# Patient Record
Sex: FEMALE | Race: WHITE
[De-identification: names, ages, dates, MRNs, and addresses within clinical notes are randomized per-mention and may not be internally consistent; named-entity substitution may affect disease eponyms.]

---

## 2018-12-06 ENCOUNTER — HOSPITAL ENCOUNTER (EMERGENCY)
Dept: HOSPITAL 92 - ERS | Age: 33
Discharge: HOME | End: 2018-12-06
Payer: COMMERCIAL
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
## 2018-12-06 DIAGNOSIS — S46.912A: Primary | ICD-10-CM

## 2018-12-06 DIAGNOSIS — M77.9: ICD-10-CM

## 2018-12-06 NOTE — RAD
TWO VIEWS LEFT FOREARM:

 

History: Patient with left forearm pain. 

 

FINDINGS: 

AP and lateral views of the left forearm obtained. No evidence of left forearm fractures, subluxation
s, or bony lesions seen. 

 

IMPRESSION: 

Normal two views left forearm. 

 

POS: C

## 2018-12-06 NOTE — RAD
THREE VIEWS LEFT WRIST:

 

History: Pain. 

 

FINDINGS: 

AP, lateral, and oblique views of the left wrist obtained. There is no evidence of left wrist fractur
es, subluxations, or bony lesions. 

 

IMPRESSION: 

Normal three views left wrist. 

 

 

 

POS: C

## 2020-03-10 ENCOUNTER — HOSPITAL ENCOUNTER (INPATIENT)
Dept: HOSPITAL 92 - L&D | Age: 35
LOS: 3 days | Discharge: HOME | End: 2020-03-13
Attending: OBSTETRICS & GYNECOLOGY | Admitting: OBSTETRICS & GYNECOLOGY
Payer: COMMERCIAL

## 2020-03-10 VITALS — BODY MASS INDEX: 28.3 KG/M2

## 2020-03-10 DIAGNOSIS — B19.20: ICD-10-CM

## 2020-03-10 DIAGNOSIS — O34.211: Primary | ICD-10-CM

## 2020-03-10 DIAGNOSIS — Z3A.39: ICD-10-CM

## 2020-03-10 DIAGNOSIS — D64.9: ICD-10-CM

## 2020-03-10 LAB
HBSAG INDEX: 0.13 S/CO (ref 0–0.99)
HGB BLD-MCNC: 11.8 G/DL (ref 12–16)
MCH RBC QN AUTO: 33.5 PG (ref 27–31)
MCV RBC AUTO: 94.3 FL (ref 78–98)
PLATELET # BLD AUTO: 261 THOU/UL (ref 130–400)
RBC # BLD AUTO: 3.53 MILL/UL (ref 4.2–5.4)
SYPHILIS ANTIBODY INDEX: 0.03 S/CO
WBC # BLD AUTO: 11.5 THOU/UL (ref 4.8–10.8)

## 2020-03-10 PROCEDURE — 36415 COLL VENOUS BLD VENIPUNCTURE: CPT

## 2020-03-10 PROCEDURE — 51702 INSERT TEMP BLADDER CATH: CPT

## 2020-03-10 PROCEDURE — 87340 HEPATITIS B SURFACE AG IA: CPT

## 2020-03-10 PROCEDURE — 86850 RBC ANTIBODY SCREEN: CPT

## 2020-03-10 PROCEDURE — 86901 BLOOD TYPING SEROLOGIC RH(D): CPT

## 2020-03-10 PROCEDURE — 86780 TREPONEMA PALLIDUM: CPT

## 2020-03-10 PROCEDURE — 86900 BLOOD TYPING SEROLOGIC ABO: CPT

## 2020-03-10 PROCEDURE — 85027 COMPLETE CBC AUTOMATED: CPT

## 2020-03-10 RX ADMIN — DOCUSATE CALCIUM SCH: 240 CAPSULE, LIQUID FILLED ORAL at 23:35

## 2020-03-10 RX ADMIN — Medication SCH: at 23:35

## 2020-03-10 NOTE — OP
DATE OF PROCEDURE:  03/10/2020



PRIMARY SURGEON:  Dr. Chris Meade.



RESIDENT SURGEON:  Dr. Farrah Walker.



PROCEDURE PERFORMED:  Repeat low transverse  section.



PREOPERATIVE DIAGNOSES:  

1. Term intrauterine pregnancy.

2. Previous  x2.

3. Hepatitis C.

4. Anemia.



POSTOPERATIVE DIAGNOSES:  

1. Term intrauterine pregnancy, delivered.

2. Repeat low transverse  section.

3. Hepatitis C.

4. Anemia.



ANESTHESIA:  Spinal.



INDICATIONS:  This is a 34-year-old, G3, P2-0-0-2 at 39 weeks' gestation, who

presents for a scheduled repeat . 



PROCEDURE IN DETAIL:  After risks, benefits, and alternatives were explained to 
the

patient, she gave informed consent.  Preoperative antibiotics included 
cefazolin 2 g

IV.  The patient was taken back to the operating room, and spinal anesthesia was

initiated.  She was placed in supine position with left tilt and prepped and 
draped

in the usual sterile fashion.  The old  scar was excised in an 
elliptical fashion, and the

Pfannenstiel incision was then carried down to the level of the fascia using 
Bovie

and the scalpel.  The fascia was sharply nicked.  The fascial cut was extended

bilaterally with Bhakta scissors.  The inferior and superior fascial edges were

elevated with Kocher clamps, and the underlying rectus muscles were sharply

dissected free using the scalpel.  The midline of recti muscles were identified 
using

hemostats. Bhakta scissors were used to enter midline through rectus muscles.

A cut was made inferiorly along the midline of the rectus muscles. The 
peritoneum was

then entered in the midline using blunt force. The peritoneum was extended 
inferiorly to 

create more room, paying close attention to the bladder. The recti muscles and 
peritoneum

 were then divided digitally and retracted manually.  The bladder blade was 
placed.  Bladder flap was

created with Metzenbaum scissors.  There was noted to be a small window.  A low

transverse score was made with the scalpel, and the uterus was entered in the

midline with the scalpel.  Clear fluid was immediately seen.  Hysterotomy was

extended manually. 



The infant was noted to be vertex and was easily delivered by fundal pressure.  
The

mouth and nares were bulb suctioned.  Cord was clamped and cut, and grossly 
normal

female infant was handed to the waiting nurse.  Cord blood was obtained.  
Placenta

was manually extracted and found to be intact with three-vessel cord and 
discarded.

The uterus was externalized, and the endometrium was curetted with a dry lap.  
The

bladder blade was replaced, and the uterus was closed with a running locking #1

chromic suture followed by closure of the bladder flap using 2-0 Monocryl on CT.

Following this, hemostasis was noted.  Abdomen was irrigated superiorly.  
Seprafilm

was then placed on the anterior portion of the uterus.  The uterus was then 
internalized, and hysterotomy

was again noted to be hemostatic.  The rectus muscles were evaluated for 
bleeders,

and bleeders were cauterized.  The fascia was then closed using 0 Vicryl suture 
in a

running nonlocking fashion.  The subcutaneous tissue was irrigated, and bleeders

were cauterized.  The subcutaneous tissue was brought together using 2-0 plain 
gut

in simple interrupted fashion.  The skin was then closed using 4-0 monofilament.

Fluffs and foam tapes were placed on the incision along with an ice pack.  All

counts were correct.  The patient tolerated the procedure well, was taken to the

recovery room in stable condition. 



ESTIMATED BLOOD LOSS:  710 mL.



COMPLICATIONS:  None.



SPECIMENS:  Cord blood sent to lab for blood type.



FINDINGS:  Grossly normal female infant with Apgars of 8 and 9 at one and five

minutes respectively.  Birth weight of 3661 g or 8 pounds, 1 ounce.  Grossly 
normal

placenta with a 3 vessel cord discarded. 



DRAINS:  Gonzales to gravity, draining clear urine.







Job ID:  [id number]



[facility]

## 2020-03-11 LAB
HGB BLD-MCNC: 10.1 G/DL (ref 12–16)
MCH RBC QN AUTO: 33.2 PG (ref 27–31)
MCV RBC AUTO: 96.2 FL (ref 78–98)
PLATELET # BLD AUTO: 205 THOU/UL (ref 130–400)
RBC # BLD AUTO: 3.03 MILL/UL (ref 4.2–5.4)
WBC # BLD AUTO: 11.1 THOU/UL (ref 4.8–10.8)

## 2020-03-11 RX ADMIN — Medication PRN TUBE: at 20:48

## 2020-03-11 RX ADMIN — Medication SCH: at 09:15

## 2020-03-11 RX ADMIN — HYDROCODONE BITARTRATE AND ACETAMINOPHEN PRN TAB: 5; 325 TABLET ORAL at 19:28

## 2020-03-11 RX ADMIN — SIMETHICONE PRN MG: 80 TABLET, CHEWABLE ORAL at 20:48

## 2020-03-11 RX ADMIN — SIMETHICONE PRN MG: 80 TABLET, CHEWABLE ORAL at 05:51

## 2020-03-11 RX ADMIN — HYDROCODONE BITARTRATE AND ACETAMINOPHEN PRN TAB: 5; 325 TABLET ORAL at 05:50

## 2020-03-11 RX ADMIN — SIMETHICONE PRN MG: 80 TABLET, CHEWABLE ORAL at 14:46

## 2020-03-11 RX ADMIN — HYDROCODONE BITARTRATE AND ACETAMINOPHEN PRN TAB: 5; 325 TABLET ORAL at 14:45

## 2020-03-11 RX ADMIN — DOCUSATE CALCIUM SCH MG: 240 CAPSULE, LIQUID FILLED ORAL at 19:28

## 2020-03-11 RX ADMIN — HYDROCODONE BITARTRATE AND ACETAMINOPHEN PRN TAB: 5; 325 TABLET ORAL at 10:27

## 2020-03-11 RX ADMIN — HYDROCODONE BITARTRATE AND ACETAMINOPHEN PRN TAB: 5; 325 TABLET ORAL at 23:39

## 2020-03-11 RX ADMIN — DOCUSATE CALCIUM SCH MG: 240 CAPSULE, LIQUID FILLED ORAL at 09:13

## 2020-03-12 RX ADMIN — HYDROCODONE BITARTRATE AND ACETAMINOPHEN PRN TAB: 5; 325 TABLET ORAL at 08:29

## 2020-03-12 RX ADMIN — DOCUSATE CALCIUM SCH MG: 240 CAPSULE, LIQUID FILLED ORAL at 08:29

## 2020-03-12 RX ADMIN — HYDROCODONE BITARTRATE AND ACETAMINOPHEN PRN TAB: 5; 325 TABLET ORAL at 03:39

## 2020-03-12 RX ADMIN — SIMETHICONE PRN MG: 80 TABLET, CHEWABLE ORAL at 08:29

## 2020-03-12 RX ADMIN — HYDROCODONE BITARTRATE AND ACETAMINOPHEN PRN TAB: 5; 325 TABLET ORAL at 21:30

## 2020-03-12 RX ADMIN — Medication SCH: at 08:21

## 2020-03-12 RX ADMIN — DOCUSATE CALCIUM SCH MG: 240 CAPSULE, LIQUID FILLED ORAL at 19:50

## 2020-03-12 RX ADMIN — Medication SCH: at 10:15

## 2020-03-12 RX ADMIN — Medication PRN TUBE: at 19:50

## 2020-03-12 RX ADMIN — HYDROCODONE BITARTRATE AND ACETAMINOPHEN PRN TAB: 5; 325 TABLET ORAL at 17:33

## 2020-03-12 RX ADMIN — SIMETHICONE PRN MG: 80 TABLET, CHEWABLE ORAL at 17:36

## 2020-03-13 VITALS — DIASTOLIC BLOOD PRESSURE: 51 MMHG | TEMPERATURE: 99.1 F | SYSTOLIC BLOOD PRESSURE: 102 MMHG

## 2020-03-13 RX ADMIN — DOCUSATE CALCIUM SCH MG: 240 CAPSULE, LIQUID FILLED ORAL at 08:25

## 2020-03-13 RX ADMIN — HYDROCODONE BITARTRATE AND ACETAMINOPHEN PRN TAB: 5; 325 TABLET ORAL at 05:08

## 2020-03-13 RX ADMIN — HYDROCODONE BITARTRATE AND ACETAMINOPHEN PRN TAB: 5; 325 TABLET ORAL at 09:41

## 2020-03-13 RX ADMIN — HYDROCODONE BITARTRATE AND ACETAMINOPHEN PRN TAB: 5; 325 TABLET ORAL at 13:19

## 2020-03-13 RX ADMIN — Medication SCH: at 09:43

## 2020-03-13 RX ADMIN — Medication SCH: at 01:58
